# Patient Record
Sex: FEMALE | Race: WHITE | Employment: OTHER | URBAN - METROPOLITAN AREA
[De-identification: names, ages, dates, MRNs, and addresses within clinical notes are randomized per-mention and may not be internally consistent; named-entity substitution may affect disease eponyms.]

---

## 2018-07-10 NOTE — PATIENT DISCUSSION
PHOTOGRAPHS: I have reviewed the external ocular photographs of this patient which show the following: significant ptosis of both upper eyelids.

## 2018-07-10 NOTE — PATIENT DISCUSSION
Bal Visual Field 36 point screen: I have reviewed the visual fields both taped and untaped on this patient which demonstrate significant obstruction of the patient's peripheral visual field on both eyes.

## 2018-07-17 NOTE — PATIENT DISCUSSION
Lower eyelid dermatochalasis. The patient is considering repair of their lower eyelid dermatochalasis. Discussed risks and benefits of a tranconjunctival lower eyelid blepharoplasty with laser resurfacing. The patient is aware of risk of prolonged redness from laser resurfacing, which may last up to 6 months in normal cases. The patient was referred to the website for further information. The patient understands that this portion is cosmetic and will schedule surgery at their convenience.

## 2018-09-18 NOTE — PATIENT DISCUSSION
Resume normal activity. Resume any medications that were discontinued for surgery. Artificial tears prn burning/itching/blurry vision. Wash incisions/ lash line once daily with baby shampoo. Sutures removed without difficulty. Discussed skin care and sun avoidance at length. Sunscreen given. Follow up in one month.

## 2018-10-23 NOTE — PATIENT DISCUSSION
Patient is continuing to heal well following surgery. Reviewed skin care and sun avoidance at length. Eye cream given. Follow up prn.

## 2020-06-18 ENCOUNTER — NEW PATIENT COMPREHENSIVE (OUTPATIENT)
Dept: URBAN - METROPOLITAN AREA CLINIC 38 | Facility: CLINIC | Age: 25
End: 2020-06-18

## 2020-06-18 DIAGNOSIS — H52.13: ICD-10-CM

## 2020-06-18 DIAGNOSIS — Z01.00: ICD-10-CM

## 2020-06-18 PROCEDURE — 92004 COMPRE OPH EXAM NEW PT 1/>: CPT

## 2020-06-18 PROCEDURE — 92015 DETERMINE REFRACTIVE STATE: CPT

## 2020-06-18 ASSESSMENT — VISUAL ACUITY
OS_SC: 20/20
OD_SC: 20/20
OS_SC: J1
OD_SC: J1

## 2020-06-18 ASSESSMENT — KERATOMETRY
OS_AXISANGLE2_DEGREES: 95
OD_K1POWER_DIOPTERS: 41.25
OS_K2POWER_DIOPTERS: 42.5
OS_AXISANGLE_DEGREES: 5
OD_AXISANGLE2_DEGREES: 80
OD_K2POWER_DIOPTERS: 42
OS_K1POWER_DIOPTERS: 41.75
OD_AXISANGLE_DEGREES: 170

## 2020-06-18 ASSESSMENT — TONOMETRY
OD_IOP_MMHG: 15
OS_IOP_MMHG: 15

## 2024-06-25 NOTE — PATIENT DISCUSSION
Problem: Pain  Goal: Verbalizes/displays adequate comfort level or baseline comfort level  6/25/2024 0445 by Vannesa Farah RN  Outcome: Progressing  6/24/2024 1834 by Stephanie Sweeney RN  Outcome: Progressing     Problem: Discharge Planning  Goal: Discharge to home or other facility with appropriate resources  Outcome: Progressing     Problem: Safety - Adult  Goal: Free from fall injury  6/25/2024 0445 by Vannesa Farah RN  Outcome: Progressing  6/24/2024 1834 by Stephanie Sweeney RN  Outcome: Progressing     Problem: Skin/Tissue Integrity  Goal: Absence of new skin breakdown  Description: 1.  Monitor for areas of redness and/or skin breakdown  2.  Assess vascular access sites hourly  3.  Every 4-6 hours minimum:  Change oxygen saturation probe site  4.  Every 4-6 hours:  If on nasal continuous positive airway pressure, respiratory therapy assess nares and determine need for appliance change or resting period.  6/25/2024 0445 by Vannesa Farah RN  Outcome: Progressing  6/24/2024 1834 by Stephanie Sweeney RN  Outcome: Progressing     Problem: Coping  Goal: Pt/Family able to verbalize concerns and demonstrate effective coping strategies  Description: INTERVENTIONS:  1. Assist patient/family to identify coping skills, available support systems and cultural and spiritual values  2. Provide emotional support, including active listening and acknowledgement of concerns of patient and caregivers  3. Reduce environmental stimuli, as able  4. Instruct patient/family in relaxation techniques, as appropriate  5. Assess for spiritual pain/suffering and initiate Spiritual Care, Psychosocial Clinical Specialist consults as needed  Outcome: Progressing     Problem: Neurosensory - Adult  Goal: Achieves stable or improved neurological status  Outcome: Progressing  Goal: Achieves maximal functionality and self care  Outcome: Progressing     Problem: Respiratory - Adult  Goal: Achieves  Notify Provider: optimal ventilation and oxygenation  Outcome: Progressing     Problem: Cardiovascular - Adult  Goal: Maintains optimal cardiac output and hemodynamic stability  Outcome: Progressing  Goal: Absence of cardiac dysrhythmias or at baseline  Outcome: Progressing     Problem: Skin/Tissue Integrity - Adult  Goal: Incisions, wounds, or drain sites healing without S/S of infection  Outcome: Progressing     Problem: Musculoskeletal - Adult  Goal: Return mobility to safest level of function  Outcome: Progressing  Goal: Return ADL status to a safe level of function  Outcome: Progressing     Problem: Gastrointestinal - Adult  Goal: Minimal or absence of nausea and vomiting  Outcome: Progressing  Goal: Maintains or returns to baseline bowel function  Outcome: Progressing  Goal: Maintains adequate nutritional intake  Outcome: Progressing     Problem: Genitourinary - Adult  Goal: Absence of urinary retention  Outcome: Progressing     Problem: ABCDS Injury Assessment  Goal: Absence of physical injury  6/25/2024 0445 by Vnanesa Farah, RN  Outcome: Progressing  6/24/2024 1834 by Stephanie Sweeney, RN  Outcome: Progressing     Problem: Nutrition Deficit:  Goal: Optimize nutritional status  Outcome: Progressing